# Patient Record
Sex: MALE | Race: WHITE | URBAN - METROPOLITAN AREA
[De-identification: names, ages, dates, MRNs, and addresses within clinical notes are randomized per-mention and may not be internally consistent; named-entity substitution may affect disease eponyms.]

---

## 2018-03-30 ENCOUNTER — TRANSFERRED RECORDS (OUTPATIENT)
Dept: HEALTH INFORMATION MANAGEMENT | Facility: CLINIC | Age: 58
End: 2018-03-30

## 2018-04-03 ENCOUNTER — TELEPHONE (OUTPATIENT)
Dept: OPHTHALMOLOGY | Facility: CLINIC | Age: 58
End: 2018-04-03

## 2018-04-03 NOTE — TELEPHONE ENCOUNTER
Cancellation tomorrow with dr. Justin    Scheduled in open new slot at 2 pm    Left message with date/time/location and direct triage number and general eye clinic number      Yong Minor RN 4:28 PM 04/03/18

## 2018-04-03 NOTE — TELEPHONE ENCOUNTER
Lisa Ly NP from [facility] called.   She was calling about referral and left VM.   [phone]   --  Triage attempted to call clinic yesterday afternoon after receiving message and was unsuccessful    Per message left today for me.    --   I spoke with clinic nurse this AM    Lisa Ly f/u referral to neuro-ophthalmology from [facility] eye clinic (no referral in system and pt partcially registered)    Pt's Cell number--  [phone]    Peripheral visual field loss last Friday 3-30-18   Ischemic optic neuropathy    CRP and ESR normal and EKG unchanged (h/o bradycardia and bundle block)    ---  Spoke to pt-- slowly started on right eye approximately 3-23-18  Seen 3-28-18 and again 3-30-18 for right eye vision loss  Vision worsened between those two days-- vision foggy  Since Friday no changes  --    Spoke to referring NP who had referral eye note from [facility]  Non proliferative diabetic retinopathy/macular edema    Ischemic optic neuropathy-- no disc edema noted  --    Will review with dr. Benavides  Notes to be faxed   Yong Minor RN 10:28 AM 04/03/18

## 2018-04-04 ENCOUNTER — HOSPITAL ENCOUNTER (OUTPATIENT)
Dept: MRI IMAGING | Facility: CLINIC | Age: 58
Discharge: HOME | End: 2018-04-04
Attending: OPHTHALMOLOGY | Admitting: OPHTHALMOLOGY
Payer: COMMERCIAL

## 2018-04-04 ENCOUNTER — OFFICE VISIT (OUTPATIENT)
Dept: OPHTHALMOLOGY | Facility: CLINIC | Age: 58
End: 2018-04-04
Attending: OPHTHALMOLOGY
Payer: COMMERCIAL

## 2018-04-04 ENCOUNTER — HOSPITAL ENCOUNTER (OUTPATIENT)
Dept: MRI IMAGING | Facility: CLINIC | Age: 58
End: 2018-04-04
Attending: OPHTHALMOLOGY
Payer: COMMERCIAL

## 2018-04-04 DIAGNOSIS — H46.9 OPTIC NEUROPATHY: ICD-10-CM

## 2018-04-04 DIAGNOSIS — H53.40 VISUAL FIELD DEFECT: ICD-10-CM

## 2018-04-04 DIAGNOSIS — H53.10 SUBJECTIVE VISUAL DISTURBANCE: Primary | ICD-10-CM

## 2018-04-04 PROCEDURE — 92133 CPTRZD OPH DX IMG PST SGM ON: CPT | Mod: ZF | Performed by: OPHTHALMOLOGY

## 2018-04-04 PROCEDURE — 70553 MRI BRAIN STEM W/O & W/DYE: CPT

## 2018-04-04 PROCEDURE — 25000128 H RX IP 250 OP 636: Performed by: OPHTHALMOLOGY

## 2018-04-04 PROCEDURE — A9585 GADOBUTROL INJECTION: HCPCS | Performed by: OPHTHALMOLOGY

## 2018-04-04 PROCEDURE — G0463 HOSPITAL OUTPT CLINIC VISIT: HCPCS | Mod: ZF | Performed by: TECHNICIAN/TECHNOLOGIST

## 2018-04-04 PROCEDURE — 92083 EXTENDED VISUAL FIELD XM: CPT | Mod: ZF | Performed by: OPHTHALMOLOGY

## 2018-04-04 PROCEDURE — 70543 MRI ORBT/FAC/NCK W/O &W/DYE: CPT

## 2018-04-04 RX ORDER — LOSARTAN POTASSIUM 50 MG/1
TABLET ORAL
COMMUNITY
Start: 2018-03-12

## 2018-04-04 RX ORDER — MECLIZINE HYDROCHLORIDE 25 MG/1
25 TABLET ORAL
COMMUNITY
Start: 2017-03-29

## 2018-04-04 RX ORDER — ROSUVASTATIN CALCIUM 10 MG/1
10 TABLET, COATED ORAL
COMMUNITY
Start: 2017-07-12

## 2018-04-04 RX ORDER — PANTOPRAZOLE SODIUM 40 MG/1
TABLET, DELAYED RELEASE ORAL
COMMUNITY
Start: 2017-07-09

## 2018-04-04 RX ORDER — VARDENAFIL HYDROCHLORIDE 20 MG/1
20 TABLET ORAL
COMMUNITY
Start: 2017-12-22

## 2018-04-04 RX ORDER — FLUTICASONE PROPIONATE 50 MCG
SPRAY, SUSPENSION (ML) NASAL
COMMUNITY
Start: 2017-12-18

## 2018-04-04 RX ORDER — GADOBUTROL 604.72 MG/ML
10 INJECTION INTRAVENOUS ONCE
Status: COMPLETED | OUTPATIENT
Start: 2018-04-04 | End: 2018-04-04

## 2018-04-04 RX ORDER — LORATADINE 10 MG/1
10 TABLET ORAL
COMMUNITY

## 2018-04-04 RX ORDER — FUROSEMIDE 20 MG
TABLET ORAL
COMMUNITY
Start: 2017-07-12

## 2018-04-04 RX ORDER — ATENOLOL 25 MG/1
TABLET ORAL
COMMUNITY
Start: 2017-04-06

## 2018-04-04 RX ADMIN — GADOBUTROL 10 ML: 604.72 INJECTION INTRAVENOUS at 20:37

## 2018-04-04 ASSESSMENT — REFRACTION_WEARINGRX
OS_CYLINDER: +0.50
OD_AXIS: 180
OS_ADD: +1.75
OS_AXIS: 125
OD_SPHERE: -1.00
OD_ADD: +1.75
OD_CYLINDER: +1.00
OS_SPHERE: -0.75

## 2018-04-04 ASSESSMENT — SLIT LAMP EXAM - LIDS
COMMENTS: NORMAL
COMMENTS: NORMAL

## 2018-04-04 ASSESSMENT — CONF VISUAL FIELD
OD_INFERIOR_TEMPORAL_RESTRICTION: 1
METHOD: COUNTING FINGERS
OD_SUPERIOR_TEMPORAL_RESTRICTION: 3
OD_INFERIOR_NASAL_RESTRICTION: 1

## 2018-04-04 ASSESSMENT — CUP TO DISC RATIO
OD_RATIO: 0.2
OS_RATIO: 0.2

## 2018-04-04 ASSESSMENT — VISUAL ACUITY
CORRECTION_TYPE: GLASSES
METHOD: SNELLEN - LINEAR
OD_CC+: -2
OD_CC: 20/50
OS_CC: 20/20

## 2018-04-04 ASSESSMENT — TONOMETRY
OS_IOP_MMHG: 14
IOP_METHOD: ICARE
OD_IOP_MMHG: 15

## 2018-04-04 ASSESSMENT — EXTERNAL EXAM - LEFT EYE: OS_EXAM: NORMAL

## 2018-04-04 ASSESSMENT — EXTERNAL EXAM - RIGHT EYE: OD_EXAM: NORMAL

## 2018-04-04 NOTE — MR AVS SNAPSHOT
After Visit Summary   4/4/2018    Salty Cavanaugh    MRN: [id number]           Patient Information     Date Of Birth          1960        Visit Information        Provider Department      4/4/2018 2:00 PM Jorge Zarate MD [facility]        Today's Diagnoses     Subjective visual disturbance    -  1    Optic neuropathy           Follow-ups after your visit        Your next 10 appointments already scheduled     Apr 04, 2018  7:00 PM CDT   (Arrive by 6:45 PM)   MR BRAIN W/O & W CONTRAST with RHMR1   [facility] ([facility])    [address]  [facility] [address]   [phone]           Take your medicines as usual, unless your doctor tells you not to. Bring a list of your current medicines to your exam (including vitamins, minerals and over-the-counter drugs).  You may or may not receive intravenous (IV) contrast for this exam pending the discretion of the Radiologist.  You do not need to do anything special to prepare.  The MRI machine uses a strong magnet. Please wear clothes without metal (snaps, zippers). A sweatsuit works well, or we may give you a hospital gown.  Please remove any body piercings and hair extensions before you arrive. You will also remove watches, jewelry, hairpins, wallets, dentures, partial dental plates and hearing aids. You may wear contact lenses, and you may be able to wear your rings. We have a safe place to keep your personal items, but it is safer to leave them at home.  **IMPORTANT** THE INSTRUCTIONS BELOW ARE ONLY FOR THOSE PATIENTS WHO HAVE BEEN PRESCRIBED SEDATION OR GENERAL ANESTHESIA DURING THEIR MRI PROCEDURE:  IF YOUR DOCTOR PRESCRIBED ORAL SEDATION (take medicine to help you relax during your exam):   You must get the medicine from your doctor (oral medication) before you arrive. Bring the medicine to the exam. Do not take it at home. You ll be told when to take it upon arriving for your exam.   Arrive one hour  early. Bring someone who can take you home after the test. Your medicine will make you sleepy. After the exam, you may not drive, take a bus or take a taxi by yourself.  IF YOUR DOCTOR PRESCRIBED IV SEDATION:   Arrive one hour early. Bring someone who can take you home after the test. Your medicine will make you sleepy. After the exam, you may not drive, take a bus or take a taxi by yourself.   No eating 6 hours before your exam. You may have clear liquids up until 4 hours before your exam. (Clear liquids include water, clear tea, black coffee and fruit juice without pulp.)  IF YOUR DOCTOR PRESCRIBED ANESTHESIA (be asleep for your exam):   Arrive 1 1/2 hours early. Bring someone who can take you home after the test. You may not drive, take a bus or take a taxi by yourself.   No eating 8 hours before your exam. You may have clear liquids up until 4 hours before your exam. (Clear liquids include water, clear tea, black coffee and fruit juice without pulp.)   You will spend four to five hours in the recovery room.  Please call the Imaging Department at your exam site with any questions.            Apr 04, 2018  7:45 PM CDT   (Arrive by 7:30 PM)   MR ORBIT W/O & W CONTRAST with RHMR1   [facility] MRI ([facility])    [address] [facility] [address] [phone]           Take your medicines as usual, unless your doctor tells you not to. Bring a list of your current medicines to your exam (including vitamins, minerals and over-the-counter drugs).  You may or may not receive intravenous (IV) contrast for this exam pending the discretion of the Radiologist.  You do not need to do anything special to prepare.  The MRI machine uses a strong magnet. Please wear clothes without metal (snaps, zippers). A sweatsuit works well, or we may give you a hospital gown.  Please remove any body piercings and hair extensions before you arrive. You will also remove watches, jewelry, hairpins, wallets,  dentures, partial dental plates and hearing aids. You may wear contact lenses, and you may be able to wear your rings. We have a safe place to keep your personal items, but it is safer to leave them at home.  **IMPORTANT** THE INSTRUCTIONS BELOW ARE ONLY FOR THOSE PATIENTS WHO HAVE BEEN PRESCRIBED SEDATION OR GENERAL ANESTHESIA DURING THEIR MRI PROCEDURE:  IF YOUR DOCTOR PRESCRIBED ORAL SEDATION (take medicine to help you relax during your exam):   You must get the medicine from your doctor (oral medication) before you arrive. Bring the medicine to the exam. Do not take it at home. You ll be told when to take it upon arriving for your exam.   Arrive one hour early. Bring someone who can take you home after the test. Your medicine will make you sleepy. After the exam, you may not drive, take a bus or take a taxi by yourself.  IF YOUR DOCTOR PRESCRIBED IV SEDATION:   Arrive one hour early. Bring someone who can take you home after the test. Your medicine will make you sleepy. After the exam, you may not drive, take a bus or take a taxi by yourself.   No eating 6 hours before your exam. You may have clear liquids up until 4 hours before your exam. (Clear liquids include water, clear tea, black coffee and fruit juice without pulp.)  IF YOUR DOCTOR PRESCRIBED ANESTHESIA (be asleep for your exam):   Arrive 1 1/2 hours early. Bring someone who can take you home after the test. You may not drive, take a bus or take a taxi by yourself.   No eating 8 hours before your exam. You may have clear liquids up until 4 hours before your exam. (Clear liquids include water, clear tea, black coffee and fruit juice without pulp.)   You will spend four to five hours in the recovery room.  Please call the Imaging Department at your exam site with any questions.              Future tests that were ordered for you today     Open Future Orders        Priority Expected Expires Ordered    MRI Brain w & w/o contrast STAT  4/4/2019 4/4/2018    MR  Orbit w/o & w Contrast STAT  2019            Who to contact     Please call your clinic at [phone] to:    Ask questions about your health    Make or cancel appointments    Discuss your medicines    Learn about your test results    Speak to your doctor            Additional Information About Your Visit        MyChart Information     [employer] is an electronic gateway that provides easy, online access to your medical records. With Backand, you can request a clinic appointment, read your test results, renew a prescription or communicate with your care team.     To sign up for Back[employer] visit the website at [email or web address]   You will be asked to enter the access code listed below, as well as some personal information. Please follow the directions to create your username and password.     Your access code is: [email or web address]  Expires: 7/3/2018  6:31 AM     Your access code will  in 90 days. If you need help or a new code, please contact your [facility] or call [phone] for assistance.        Care EveryWhere ID     This is your Care EveryWhere ID. This could be used by other organizations to access your [place] medical records  [id number]         Blood Pressure from Last 3 Encounters:   No data found for BP    Weight from Last 3 Encounters:   No data found for Wt              We Performed the Following     Glaucoma Top OU     OCT Optic Nerve RNFL Spectralis OU (both eyes)        Primary Care Provider Office Phone # Fax #    Duane Strand [phone]       [address] [address]        Equal Access to Services     MILENA DURHAM : Hadii aad ku hadasho Soomaali, waaxda luqadaha, qaybta kaalmada adeegyada, waxay iramin haychann bhavana brody'dayanna piña. So [facility] [phone].    ATENCIÓN: Si habla español, tiene a espinoza disposición servicios gratuitos de asistencia lingüística. Llame al [phone].    We comply with  applicable federal civil rights laws and [place] laws. We do not discriminate on the basis of race, color, national origin, age, disability, sex, sexual orientation, or gender identity.            Thank you!     Thank you for choosing EYE CLINIC  for your care. Our goal is always to provide you with excellent care. Hearing back from our patients is one way we can continue to improve our services. Please take a few minutes to complete the written survey that you may receive in the mail after your visit with us. Thank you!             Your Updated Medication List - Protect others around you: Learn how to safely use, store and throw away your medicines at www.disposemymeds.org.          This list is accurate as of 4/4/18  4:01 PM.  Always use your most recent med list.                   Brand Name Dispense Instructions for use Diagnosis    aspirin 81 MG EC tablet      Take 81 mg by mouth        atenolol 25 MG tablet    TENORMIN     TAKE ONE TABLET BY MOUTH EVERY DAY        fluticasone 50 MCG/ACT spray    FLONASE     INSTILL 2 SPRAYS NASALLY ONE TIME A DAY. SHAKE WELL BEFORE USE; ALTERN ATE NOSTRILS WITH EACH SPRAY.        furosemide 20 MG tablet    LASIX     Take 1 tab every Monday weds and friday        loratadine 10 MG tablet    CLARITIN     Take 10 mg by mouth        losartan 50 MG tablet    COZAAR     TAKE ONE TABLET BY MOUTH EVERY DAY        meclizine 25 MG tablet    ANTIVERT     Take 25 mg by mouth        metFORMIN 500 MG tablet    GLUCOPHAGE     TAKE ONE TABLET BY MOUTH EVERY DAY WITH FOOD        Misc. Devices Misc      Supply requested: vacuum erection device.  Diagnosis: erectile dysfunction        pantoprazole 40 MG EC tablet    PROTONIX     TAKE ONE TABLET BY MOUTH EVERY DAY        rosuvastatin 10 MG tablet    CRESTOR     Take 10 mg by mouth        vardenafil 20 MG tablet    LEVITRA     Take 20 mg by mouth        vitamin D3 1000 UNITS Caps

## 2018-04-04 NOTE — PROGRESS NOTES
1. Progressive retrobulbar right optic neuropathy- will institute urgent MRI brain and orbits with and without IV contrast.  If negative will proceed with additional work-up for giant cell arteritis (temporal artery biopsy).    Salty Cavanaugh is a pleasant 57 year old White male who presents to my neuro-ophthalmology clinic today as a referral from Dr. Ho for a right acute optic neuropathy of unclear etiology.    Patient noted painless vision loss in the inferior half of his vision in the right eye.  He first noted this on 3/23/18.  He has had kaleidoscope like visual phenomena in the right eye also.   His visual field defect started inferiorly and has progressed over days to involve his entire field.    No jaw claudication.  He did have an infected tooth / abscess pulled on Monday and replaced with a partial.  No proximal muscle weakness or fatigue. No low grade fevers.  No scalp tenderness or temple tenderness.  He has had mild right sided temple pain recently (very mild).      The patient was seen on March 30, 2018 by Dr. Ho and at that time was found to have 2040 visual acuity in the right eye with correction.  He described a central and inferior visual field defect at that time.  Dilated fundus examination showed 1 dot blot hemorrhage on the right and a normal-appearing optic nerve head without swelling or pallor.  Fluorescein angiography was performed and was essentially unremarkable aside from blockage from the dot blot heme.  There was no comment on choroidal filling abnormalities thus presumably vascular filling of the choroid and retina was unremarkable.  OCT of the macula was unremarkable aside from a few parafoveal cysts in the right eye that were incidental and  would not explain his visual loss.  The patient underwent appropriate blood testing for giant cell arteritis screening purposes.  He was referred to my clinic for further evaluation.    Blood work on 3/30/18 shows: normal complete  blood count (CBC) with platelets of 199, CRP 0.1, ESR 14.    Past medical history: diabetes mellitus type 2- last hgbA1c was 6.8% (in last 6 months).   Patient had gastric bypass surgery in 2006.  Has lost 165 lbs (regained 50 lbs in last 2-3 years).  No ETOH.  No smoking.  History of obstructive sleep apnea on CPAP. Hypertension- controlled with medication.  Hyperlipidemia controlled.  Patient uses Levitra and last used 3-4 weeks ago.  Patient takes baby aspirin daily.    Examination today shows 20/50 -2 vision in the right eye and 20/20 in the left.  The patient was unable to identify any Ishihara color plates in the right eye and color vision was normal in the left.  There was a right afferent pupillary defect on my check.  Slit-lamp examination was remarkable for moderate nuclear sclerotic cataracts in both eyes that were symmetric and not visually significant.  Dilated fundus exam showed rare scattered lattice degeneration in the periphery in both eyes as well as frequent cobblestone degeneration.  Both optic nerves were normal appearing without swelling or pallor.  Cranial nerves V1-3, 7,8,9,11, and 12 were normal bilaterally.    OCT of the optic nerve head revealed normal retinal nerve fiber layer thickness in both eyes.  Octopus automated 30 degree visual field today showed a dense inferior altitudinal defect with encroachment into the superior visual field and central visual axis in the right eye.  In the left eye there was scattered peripheral constriction of unclear significance.    While this patient certainly has risk factors for poor blood vessel health and ischemic optic neuropathy and he has a small cup-to-disc ratio in both eyes, his lack of optic nerve head swelling excludes the possibility of non-arteritic anterior ischemic optic neuropathy.  He certainly has a right optic neuropathy but in the setting of a normal optic nerve head the localization is retrobulbar.  Given this context, etiologies  could include a rapidly compressive optic neuropathy such as an aneurysm or tumor with recent hemorrhage.  He could also have an atypical optic neuritis.  If the MRI comes back normal we will have to revisit the possibility of posterior ischemic optic neuropathy secondary to giant cell arteritis.   His normal acute phase reactants and lack of other symptoms to suggest giant cell arteritis makes it less likely however it still remains a distinct possibility if neuro-imaging unremarkable.    Addendum (4/5/18):   MRI of the brain and orbits with and without IV contrast performed urgently on April 4, 2018 came back unremarkable per radiology. I reviewed the images myself and I agree with the interpretation.    In this setting we must reconsider the diagnosis of giant cell arteritis even though patient does not have suggestive symptoms nor elevated acute phase reactants nor choroidal filling abnormalities.  I have seen patients with biopsy confirmed giant cell arteritis present in exactly this fashion previously.  I called the patient and informed him of his MRI findings and discussed the plan moving forward.  We will place him on prednisone 60 mg daily pending the results of a right sided temporal artery biopsy early next week.  I have asked him to take his usual Protonix twice daily instead of once daily for GI protection.  If his temporal artery biopsy is negative we will taper him off steroids quickly.  Finally I informed him that his blood sugars may increase with prednisone and that he must monitor them carefully given his pre-existing diabetes.  He should call his primary care physician if his blood sugars go high.         I spent a total of 60 minutes face to face with Salty Cavanaugh during today's office visit.  Over 50% of this time was spent counseling the patient and/or coordinating care regarding right optic neuropathy of unclear cause.    Complete documentation of historical and exam elements from today's  encounter can be found in the full encounter summary report (not reduplicated in this progress note).  I personally obtained the chief complaint(s) and history of present illness.  I confirmed and edited as necessary the review of systems, past medical/surgical history, family history, social history, and examination findings as documented by others; and I examined the patient myself.  I personally reviewed the relevant tests, images, and reports as documented above.  I formulated and edited as necessary the assessment and plan and discussed the findings and management plan with the patient and family.  I personally reviewed the ophthalmic test(s) associated with this encounter, agree with the interpretation(s) as documented by the resident/fellow, and have edited the corresponding report(s) as necessary.     Jorge Zarate MD

## 2018-04-04 NOTE — LETTER
2018    RE: Salty Cavanaugh  : 1960  MRN: [id number]    Dear Dr. Ho,    Thank you for referring your patient, Salty Cavanaugh, to my neuro-ophthalmology clinic recently.  After a thorough neuro-ophthalmic history and examination, I came to the following conclusions:     1. Progressive retrobulbar right optic neuropathy- will institute urgent MRI brain and orbits with and without IV contrast.  If negative will proceed with additional work-up for giant cell arteritis (temporal artery biopsy).    Salty Cavanaugh is a pleasant 57 year old White male who presents to my neuro-ophthalmology clinic today as a referral from Dr. Ho for a right acute optic neuropathy of unclear etiology.    Patient noted painless vision loss in the inferior half of his vision in the right eye.  He first noted this on 3/23/18.  He has had kaleidoscope like visual phenomena in the right eye also.   His visual field defect started inferiorly and has progressed over days to involve his entire field.    No jaw claudication.  He did have an infected tooth / abscess pulled on Monday and replaced with a partial.  No proximal muscle weakness or fatigue. No low grade fevers.  No scalp tenderness or temple tenderness.  He has had mild right sided temple pain recently (very mild).      The patient was seen on 2018 by Dr. Ho and at that time was found to have 2040 visual acuity in the right eye with correction.  He described a central and inferior visual field defect at that time.  Dilated fundus examination showed 1 dot blot hemorrhage on the right and a normal-appearing optic nerve head without swelling or pallor.  Fluorescein angiography was performed and was essentially unremarkable aside from blockage from the dot blot heme.  There was no comment on choroidal filling abnormalities thus presumably vascular filling of the choroid and retina was unremarkable.  OCT of the macula was unremarkable aside from a few parafoveal  cysts in the right eye that were incidental and  would not explain his visual loss.  The patient underwent appropriate blood testing for giant cell arteritis screening purposes.  He was referred to my clinic for further evaluation.    Blood work on 3/30/18 shows: normal complete blood count (CBC) with platelets of 199, CRP 0.1, ESR 14.    Past medical history: diabetes mellitus type 2- last hgbA1c was 6.8% (in last 6 months).   Patient had gastric bypass surgery in 2006.  Has lost 165 lbs (regained 50 lbs in last 2-3 years).  No ETOH.  No smoking.  History of obstructive sleep apnea on CPAP. Hypertension- controlled with medication.  Hyperlipidemia controlled.  Patient uses Levitra and last used 3-4 weeks ago.  Patient takes baby aspirin daily.    Examination today shows 20/50 -2 vision in the right eye and 20/20 in the left.  The patient was unable to identify any Ishihara color plates in the right eye and color vision was normal in the left.  There was a right afferent pupillary defect on my check.  Slit-lamp examination was remarkable for moderate nuclear sclerotic cataracts in both eyes that were symmetric and not visually significant.  Dilated fundus exam showed rare scattered lattice degeneration in the periphery in both eyes as well as frequent cobblestone degeneration.  Both optic nerves were normal appearing without swelling or pallor.  Cranial nerves V1-3, 7,8,9,11, and 12 were normal bilaterally.    OCT of the optic nerve head revealed normal retinal nerve fiber layer thickness in both eyes.  Octopus automated 30 degree visual field today showed a dense inferior altitudinal defect with encroachment into the superior visual field and central visual axis in the right eye.  In the left eye there was scattered peripheral constriction of unclear significance.    While this patient certainly has risk factors for poor blood vessel health and ischemic optic neuropathy and he has a small cup-to-disc ratio in both  eyes, his lack of optic nerve head swelling excludes the possibility of non-arteritic anterior ischemic optic neuropathy.  He certainly has a right optic neuropathy but in the setting of a normal optic nerve head the localization is retrobulbar.  Given this context, etiologies could include a rapidly compressive optic neuropathy such as an aneurysm or tumor with recent hemorrhage.  He could also have an atypical optic neuritis.  If the MRI comes back normal we will have to revisit the possibility of posterior ischemic optic neuropathy secondary to giant cell arteritis.   His normal acute phase reactants and lack of other symptoms to suggest giant cell arteritis makes it less likely however it still remains a distinct possibility if neuro-imaging unremarkable.    Addendum (4/5/18):   MRI of the brain and orbits with and without IV contrast performed urgently on April 4, 2018 came back unremarkable per radiology. I reviewed the images myself and I agree with the interpretation.    In this setting we must reconsider the diagnosis of giant cell arteritis even though patient does not have suggestive symptoms nor elevated acute phase reactants nor choroidal filling abnormalities.  I have seen patients with biopsy confirmed giant cell arteritis present in exactly this fashion previously.  I called the patient and informed him of his MRI findings and discussed the plan moving forward.  We will place him on prednisone 60 mg daily pending the results of a right sided temporal artery biopsy early next week.  I have asked him to take his usual Protonix twice daily instead of once daily for GI protection.  If his temporal artery biopsy is negative we will taper him off steroids quickly.  Finally I informed him that his blood sugars may increase with prednisone and that he must monitor them carefully given his pre-existing diabetes.  He should call his primary care physician if his blood sugars go high.    Again, thank you for  trusting me with the care of your patient.  For further exam details, please feel free to contact our office for additional records.  If you wish to contact me regarding this patient please email me at [email or web address] or give my clinic a call to arrange a phone conversation.    Sincerely,  Jorge Zarate MD  , Neuro-Ophthalmology and Adult Strabismus  Department of Ophthalmology and Visual Neurosciences  [facility]    DX: retrobulbar optic neuropathy

## 2018-04-04 NOTE — NURSING NOTE
Chief Complaints and History of Present Illnesses   Patient presents with     New Patient     Ischemic optic neuropathy     HPI    Symptoms:              Comments:  Salty Cavanaugh is a 57 year old M, here for ischemic optic neuropathy.     Per telephone note with Yong on 4/3/2018:  -Peripheral visual field loss last Friday 3-30-18   Spoke to patient-- slowly started on right eye approximately 3-23-18  Seen 3-28-18 and again 3-30-18 for right eye vision loss  Vision worsened between those two days-- vision foggy    -CRP and ESR normal and EKG unchanged (h/o bradycardia and bundle block)    -Non proliferative diabetic retinopathy/macular edema  -Ischemic optic neuropathy-- no disc edema noted    Patient states vision loss has gotten worse initially but not worse since last Friday when he saw ophth.   Pain on RIGHT temporal vision.   Denies double vision.   ISIDRA Arreguin 4/4/2018 1:52 PM

## 2018-04-05 ENCOUNTER — TELEPHONE (OUTPATIENT)
Dept: OPHTHALMOLOGY | Facility: CLINIC | Age: 58
End: 2018-04-05

## 2018-04-05 RX ORDER — PREDNISONE 20 MG/1
60 TABLET ORAL EVERY MORNING
Qty: 90 TABLET | Refills: 6 | Status: SHIPPED | OUTPATIENT
Start: 2018-04-05

## 2018-04-05 NOTE — TELEPHONE ENCOUNTER
Contacted pt at the request of Dr Zarate to schedule right-sided TAB.  Added them to Dr. Benavides's schedule 4/11.

## 2018-04-10 ENCOUNTER — TELEPHONE (OUTPATIENT)
Dept: OPHTHALMOLOGY | Facility: CLINIC | Age: 58
End: 2018-04-10

## 2018-04-10 NOTE — TELEPHONE ENCOUNTER
----- Message from Lynn Torre sent at 4/9/2018 10:50 AM CDT -----  Regarding: FW: Pt wondering if Dr. Zarate will be at next appt  Contact: [phone]  Can you please call this patient to let them know either way.    Thanks,    Lynn Torre COT 10:51 AM April 9, 2018     ----- Message -----     From: Steve Armijo     Sent: 4/9/2018   9:54 AM       To: [facility]  Subject: Pt wondering if Dr. Zarate will be at ne#    Pt called wondering if Dr. Zarate will be at his next procedure appt with Dr. Benavides. Either way, please call pt back at [phone].    Thank you,    Steve Armijo  Call Center    Please DO NOT send this message and/or reply back to sender. Call Center Representatives DO NOT respond to messages.

## 2018-04-10 NOTE — TELEPHONE ENCOUNTER
Called and spoke with patient, let him know Dr. Zarate wouldn't be at his TAB appointment, patient said he was wondering because last time we got him in for MRI so quickly so they stayed in city and got everything done before heading home. Patient states if an appointment is necessary after TAB he is willing to stay in city and come for appointment a day or two later. Otherwise, they will head home and wait for call about results. I told patient I would ask doctor to review results as soon as they are available and communicate plan going forward to patient asap.

## 2018-04-11 ENCOUNTER — OFFICE VISIT (OUTPATIENT)
Dept: OPHTHALMOLOGY | Facility: CLINIC | Age: 58
End: 2018-04-11
Payer: COMMERCIAL

## 2018-04-11 DIAGNOSIS — H53.131 SUDDEN VISUAL LOSS OF RIGHT EYE: Primary | ICD-10-CM

## 2018-04-11 NOTE — PROGRESS NOTES
Assessment & Plan     Salty Cavanaugh is a 57 year old male with the following diagnoses:   1. Sudden visual loss of right eye       Patient underwent right temporal artery biopsy without complication.  Patient instructed to remove patch tomorrow.  No swimming for a week.  Stitches will dissolve. Ok to wash starting tomorrow.  Will use bacitracin ointment twice a day until tube is gone.  Patient instructed to put ice on the patch today and take tylenol for pain.  Patient instructed to put direct pressure on the wound if it bleeds.  If still bleeding > 20 min, patient instructed to call the office at [phone].             Attending Physician Attestation:  Complete documentation of historical and exam elements from today's encounter can be found in the full encounter summary report (not reduplicated in this progress note).  I personally obtained the chief complaint(s) and history of present illness.  I confirmed and edited as necessary the review of systems, past medical/surgical history, family history, social history, and examination findings as documented by others; and I examined the patient myself.  I personally reviewed the relevant tests, images, and reports as documented above.  I formulated and edited as necessary the assessment and plan and discussed the findings and management plan with the patient and family. - Erwin Benavides MD

## 2018-04-11 NOTE — MR AVS SNAPSHOT
After Visit Summary   2018    Salty Cavanaugh    MRN: [id number]           Patient Information     Date Of Birth          1960        Visit Information        Provider Department      2018 11:00 AM Erwin Benavides MD [facility]        Today's Diagnoses     Sudden visual loss of right eye    -  1       Follow-ups after your visit        Future tests that were ordered for you today     Open Future Orders        Priority Expected Expires Ordered    Surgical pathology exam Routine  7/10/2018 2018            Who to contact     Please call your clinic at [phone] to:    Ask questions about your health    Make or cancel appointments    Discuss your medicines    Learn about your test results    Speak to your doctor            Additional Information About Your Visit        MyChart Information     [employer] is an electronic gateway that provides easy, online access to your medical records. With [employer]Rock[employer], you can request a clinic appointment, read your test results, renew a prescription or communicate with your care team.     To sign up for [employer] visit the website at [email or web address]   You will be asked to enter the access code listed below, as well as some personal information. Please follow the directions to create your username and password.     Your access code is: [email or web address]  Expires: 7/3/2018  6:31 AM     Your access code will  in 90 days. If you need help or a new code, please contact your [facility] or call [phone] for assistance.        Care EveryWhere ID     This is your Care EveryWhere ID. This could be used by other organizations to access your [facility] medical records  [id number]         Blood Pressure from Last 3 Encounters:   No data found for BP    Weight from Last 3 Encounters:   No data found for Wt              We Performed the Following     Ligation or Biopsy of Temporal Artery        Primary Care  Provider Office Phone # Fax #    Duane Strand [phone]       [address] [address]        Equal Access to Services     DALJITROSANNE HERMINIO : Hadmonserrat tanmay pereira artemioo Sobobali, waaxda luqadaha, qaybta kaalmada adejesse, dax kinney laLorridayanna piña. So [facility] [phone].    ATENCIÓN: Si habla español, tiene a espinoza disposición servicios gratuitos de asistencia lingüística. Sophia al [phone].    We comply with applicable federal civil rights laws and [place] laws. We do not discriminate on the basis of race, color, national origin, age, disability, sex, sexual orientation, or gender identity.            Thank you!     Thank you for choosing [facility] OPHTHALMOLOGY  for your care. Our goal is always to provide you with excellent care. Hearing back from our patients is one way we can continue to improve our services. Please take a few minutes to complete the written survey that you may receive in the mail after your visit with us. Thank you!             Your Updated Medication List - Protect others around you: Learn how to safely use, store and throw away your medicines at www.disposemymeds.org.          This list is accurate as of 4/11/18 12:12 PM.  Always use your most recent med list.                   Brand Name Dispense Instructions for use Diagnosis    aspirin 81 MG EC tablet      Take 81 mg by mouth        atenolol 25 MG tablet    TENORMIN     TAKE ONE TABLET BY MOUTH EVERY DAY        fluticasone 50 MCG/ACT spray    FLONASE     INSTILL 2 SPRAYS NASALLY ONE TIME A DAY. SHAKE WELL BEFORE USE; ALTERN ATE NOSTRILS WITH EACH SPRAY.        furosemide 20 MG tablet    LASIX     Take 1 tab every Monday weds and friday        loratadine 10 MG tablet    CLARITIN     Take 10 mg by mouth        losartan 50 MG tablet    COZAAR     TAKE ONE TABLET BY MOUTH EVERY DAY        meclizine 25 MG tablet    ANTIVERT     Take 25 mg by mouth        metFORMIN 500 MG tablet     GLUCOPHAGE     TAKE ONE TABLET BY MOUTH EVERY DAY WITH FOOD        Misc. Devices Misc      Supply requested: vacuum erection device.  Diagnosis: erectile dysfunction        pantoprazole 40 MG EC tablet    PROTONIX     TAKE ONE TABLET BY MOUTH EVERY DAY        predniSONE 20 MG tablet    DELTASONE    90 tablet    Take 3 tablets (60 mg) by mouth every morning    Optic neuropathy       rosuvastatin 10 MG tablet    CRESTOR     Take 10 mg by mouth        vardenafil 20 MG tablet    LEVITRA     Take 20 mg by mouth        vitamin D3 1000 units Caps

## 2018-04-12 LAB — COPATH REPORT: NORMAL

## 2018-04-13 ENCOUNTER — TELEPHONE (OUTPATIENT)
Dept: OPHTHALMOLOGY | Facility: CLINIC | Age: 58
End: 2018-04-13

## 2018-04-13 NOTE — PROGRESS NOTES
Enclosed are the results of your temporal artery biopsy. Dr. Jorge Zarate will discuss next steps with you.     Thank you for allowing me to share in your care.     Erwin Benavides MD

## 2018-04-13 NOTE — TELEPHONE ENCOUNTER
I called and spoke with patient on the phone regarding the results of his recent temporal artery biopsy which came back normal / negative for giant cell arteritis.  At this point we do not have sufficient evidence for giant cell arteritis to continue corticosteroid treatment.  The patient feels his vision may be slightly better since our recent clinci visit but no dramatic changes.  Advised patient to taper off corticosteroids as follows:    Prednisone 40 mg daily x 2 days  Prednisone 20 mg daily x 2 days  Prednisone 10 mg daily x 2 days then stop    Go back to once daily (routine) GI prophylaxis once off Prednisone.  Patient will follow-up with me in 3 weeks for repeat check-- or sooner as needed for worsening vision.

## 2018-05-01 DIAGNOSIS — H53.40 VISUAL FIELD DEFECT: Primary | ICD-10-CM

## 2018-05-02 ENCOUNTER — OFFICE VISIT (OUTPATIENT)
Dept: OPHTHALMOLOGY | Facility: CLINIC | Age: 58
End: 2018-05-02
Attending: OPHTHALMOLOGY
Payer: COMMERCIAL

## 2018-05-02 DIAGNOSIS — H53.10 SUBJECTIVE VISUAL DISTURBANCE: Primary | ICD-10-CM

## 2018-05-02 DIAGNOSIS — H46.9 OPTIC NEUROPATHY: ICD-10-CM

## 2018-05-02 DIAGNOSIS — H53.40 VISUAL FIELD DEFECT: ICD-10-CM

## 2018-05-02 LAB
CRP SERPL-MCNC: <2.9 MG/L (ref 0–8)
ERYTHROCYTE [SEDIMENTATION RATE] IN BLOOD BY WESTERGREN METHOD: 11 MM/H (ref 0–20)

## 2018-05-02 PROCEDURE — 92083 EXTENDED VISUAL FIELD XM: CPT | Mod: ZF | Performed by: OPHTHALMOLOGY

## 2018-05-02 PROCEDURE — G0463 HOSPITAL OUTPT CLINIC VISIT: HCPCS | Mod: ZF | Performed by: TECHNICIAN/TECHNOLOGIST

## 2018-05-02 PROCEDURE — 92133 CPTRZD OPH DX IMG PST SGM ON: CPT | Mod: ZF | Performed by: OPHTHALMOLOGY

## 2018-05-02 ASSESSMENT — VISUAL ACUITY
CORRECTION_TYPE: GLASSES
OS_CC: 20/20
METHOD: SNELLEN - LINEAR
OD_CC: 20/25

## 2018-05-02 ASSESSMENT — REFRACTION_WEARINGRX
OD_AXIS: 180
OS_ADD: +1.75
OD_CYLINDER: +1.00
OD_SPHERE: -1.00
OS_CYLINDER: +0.50
OS_AXIS: 125
OS_SPHERE: -0.75
OD_ADD: +1.75

## 2018-05-02 ASSESSMENT — SLIT LAMP EXAM - LIDS
COMMENTS: NORMAL
COMMENTS: NORMAL

## 2018-05-02 ASSESSMENT — TONOMETRY
OD_IOP_MMHG: 11
OS_IOP_MMHG: 10
IOP_METHOD: ICARE

## 2018-05-02 ASSESSMENT — CUP TO DISC RATIO
OS_RATIO: 0.2
OD_RATIO: 0.2

## 2018-05-02 ASSESSMENT — EXTERNAL EXAM - RIGHT EYE: OD_EXAM: NORMAL

## 2018-05-02 ASSESSMENT — EXTERNAL EXAM - LEFT EYE: OS_EXAM: NORMAL

## 2018-05-02 NOTE — NURSING NOTE
Chief Complaints and History of Present Illnesses   Patient presents with     Follow Up For     visual field defect     HPI    Symptoms:              Comments:  3 weeks follow up for visual field defect.     Temporal artery biopsy results came back unremarkable.   Last took medication, prednisone was last week (possibly Thursday per patient).     Vision in LEFT eye blurry, saw local OD and was told BS may be high and affecting LEFT eye vision, observe.     ISIDRA Arreguin 5/2/2018 10:47 AM

## 2018-05-02 NOTE — MR AVS SNAPSHOT
After Visit Summary   5/2/2018    Salty Cavanaugh    MRN: [id number]           Patient Information     Date Of Birth          1960        Visit Information        Provider Department      5/2/2018 10:45 AM Jorge Zarate MD [facility]        Today's Diagnoses     Subjective visual disturbance - Left Eye    -  1    Visual field defect - Both Eyes        Optic neuropathy - Left Eye           Follow-ups after your visit        Your next 10 appointments already scheduled     May 30, 2018 10:30 AM CDT   RETURN NEURO with Jorge Zarate MD   Eye Clinic ([facility])    [address]  [facility] [address]   [phone]              Who to contact     Please call your clinic at [phone] to:    Ask questions about your health    Make or cancel appointments    Discuss your medicines    Learn about your test results    Speak to your doctor            Additional Information About Your Visit        MyChart Information     [employer] gives you secure access to your electronic health record. If you see a primary care provider, you can also send messages to your care team and make appointments. If you have questions, please call your primary care clinic.  If you do not have a primary care provider, please call [phone] and they will assist you.      [employer] is an electronic gateway that provides easy, online access to your medical records. With [employer], you can request a clinic appointment, read your test results, renew a prescription or communicate with your care team.     To access your existing account, please contact your [facility] or call [phone] for assistance.        Care EveryWhere ID     This is your Care EveryWhere ID. This could be used by other organizations to access your [facility] medical records  [id number]         Blood Pressure from Last 3 Encounters:   No data found for  BP    Weight from Last 3 Encounters:   No data found for Wt              We Performed the Following     Glaucoma Top OU     IOP Measurement     OCT Optic Nerve RNFL Spectralis OU (both eyes)        Primary Care Provider Office Phone # Fax #    Duane Strand [phone]       [place] [address] [facility] [address]        Equal Access to Services     MILENA DURHAM : Hadii aad ku hadasho Soomaali, waaxda luqadaha, qaybta kaalmada adeegyada, waxay idiin hayaan adeeg irenemartha lanavin . So [facility] [place] [facility] [phone].    ATENCIÓN: Si habla español, tiene a espinoza disposición servicios gratuitos de asistencia lingüística. Fernandopopeye al [phone].    We comply with applicable federal civil rights laws and [place] laws. We do not discriminate on the basis of race, color, national origin, age, disability, sex, sexual orientation, or gender identity.            Thank you!     Thank you for choosing EYE CLINIC  for your care. Our goal is always to provide you with excellent care. Hearing back from our patients is one way we can continue to improve our services. Please take a few minutes to complete the written survey that you may receive in the mail after your visit with us. Thank you!             Your Updated Medication List - Protect others around you: Learn how to safely use, store and throw away your medicines at www.disposemymeds.org.          This list is accurate as of 5/2/18 11:59 PM.  Always use your most recent med list.                   Brand Name Dispense Instructions for use Diagnosis    aspirin 81 MG EC tablet      Take 81 mg by mouth        atenolol 25 MG tablet    TENORMIN     TAKE ONE TABLET BY MOUTH EVERY DAY        fluticasone 50 MCG/ACT spray    FLONASE     INSTILL 2 SPRAYS NASALLY ONE TIME A DAY. SHAKE WELL BEFORE USE; ALTERN ATE NOSTRILS WITH EACH SPRAY.        furosemide 20 MG tablet    LASIX     Take 1 tab every Monday weds and friday        loratadine 10 MG tablet    CLARITIN     Take  10 mg by mouth        losartan 50 MG tablet    COZAAR     TAKE ONE TABLET BY MOUTH EVERY DAY        meclizine 25 MG tablet    ANTIVERT     Take 25 mg by mouth        metFORMIN 500 MG tablet    GLUCOPHAGE     TAKE ONE TABLET BY MOUTH EVERY DAY WITH FOOD        Misc. Devices Misc      Supply requested: vacuum erection device.  Diagnosis: erectile dysfunction        pantoprazole 40 MG EC tablet    PROTONIX     TAKE ONE TABLET BY MOUTH EVERY DAY        predniSONE 20 MG tablet    DELTASONE    90 tablet    Take 3 tablets (60 mg) by mouth every morning    Optic neuropathy       rosuvastatin 10 MG tablet    CRESTOR     Take 10 mg by mouth        vardenafil 20 MG tablet    LEVITRA     Take 20 mg by mouth        vitamin D3 1000 units Caps

## 2018-05-29 DIAGNOSIS — H53.10 SUBJECTIVE VISUAL DISTURBANCE: Primary | ICD-10-CM

## 2018-05-30 ENCOUNTER — OFFICE VISIT (OUTPATIENT)
Dept: OPHTHALMOLOGY | Facility: CLINIC | Age: 58
End: 2018-05-30
Attending: OPHTHALMOLOGY
Payer: COMMERCIAL

## 2018-05-30 DIAGNOSIS — H53.10 SUBJECTIVE VISUAL DISTURBANCE: ICD-10-CM

## 2018-05-30 DIAGNOSIS — H53.40 VISUAL FIELD DEFECT: ICD-10-CM

## 2018-05-30 DIAGNOSIS — H46.9 OPTIC NEUROPATHY: Primary | ICD-10-CM

## 2018-05-30 PROCEDURE — 92083 EXTENDED VISUAL FIELD XM: CPT | Mod: ZF | Performed by: OPHTHALMOLOGY

## 2018-05-30 PROCEDURE — G0463 HOSPITAL OUTPT CLINIC VISIT: HCPCS | Mod: ZF | Performed by: TECHNICIAN/TECHNOLOGIST

## 2018-05-30 RX ORDER — AMLODIPINE BESYLATE 2.5 MG/1
TABLET ORAL
COMMUNITY
Start: 2018-03-12

## 2018-05-30 RX ORDER — SIMVASTATIN 10 MG
TABLET ORAL
COMMUNITY
Start: 2017-06-28

## 2018-05-30 ASSESSMENT — VISUAL ACUITY
OS_CC: 20/20
CORRECTION_TYPE: GLASSES
METHOD: SNELLEN - LINEAR

## 2018-05-30 ASSESSMENT — REFRACTION_WEARINGRX
OS_SPHERE: -0.75
OD_CYLINDER: +1.00
OD_ADD: +1.75
OS_ADD: +1.75
OS_CYLINDER: +0.50
OD_SPHERE: -1.00
OD_AXIS: 180
OS_AXIS: 125

## 2018-05-30 ASSESSMENT — CONF VISUAL FIELD
OD_NORMAL: 1
METHOD: COUNTING FINGERS
OS_NORMAL: 1

## 2018-05-30 ASSESSMENT — CUP TO DISC RATIO
OS_RATIO: 0.2
OD_RATIO: 0.2

## 2018-05-30 ASSESSMENT — EXTERNAL EXAM - RIGHT EYE: OD_EXAM: NORMAL

## 2018-05-30 ASSESSMENT — EXTERNAL EXAM - LEFT EYE: OS_EXAM: NORMAL

## 2018-05-30 ASSESSMENT — TONOMETRY
OD_IOP_MMHG: 12
OS_IOP_MMHG: 11
IOP_METHOD: ICARE

## 2018-05-30 ASSESSMENT — SLIT LAMP EXAM - LIDS
COMMENTS: NORMAL
COMMENTS: NORMAL

## 2018-05-30 NOTE — PROGRESS NOTES
1. Progressive retrobulbar right optic neuropathy- etiology remains unclear at this time.  MRI brain and orbits with and without IV contrast  was unremarkable on radiology read and my review.  Right sided temporal artery biopsy negative.  There was some improvement in vision while on steroids but not clear if this was the natural history of his disease or whether it was steroid responsive.  Will continue to observe and check serial acute phase reactants (which have thus far been normal).  If spike in acute phase reactants then perform left temporal artery biopsy. If vision loss recurs then repeat neuro-imaging and also consider temporal artery biopsy if neuro-imaging negative.    - For a more thorough description of the disease presentation, please see my letter from the patient's initial visit with me    - status post  3 weeks of Prednisone starting 4/4/18.  Vision improved from 20/50 visual acuity to 20/25 and 0/11 to 7.5/11 Ishihara color plates.    - today patient denies any new vision changes since last visit (off prednisone since the end of April)  - Exam today shows mostly stable vision although color vision is down slightly in the right eye.  - right optic nerve head shows trace temporal pallor.    - octopus automated 30 degree visual field today shows stable global depression in the right eye and stable nonspecific deficits of unclear significance (possible artifact) in the left eye.    Recheck ESR / CRP today.  If spikes then consider left temporal artery biopsy.  If vision loss recurs then fluorescein angiography, left temporal artery biopsy consideration, and repeat MRI brain/orbits.    Follow-up in 1 month or sooner as needed for new vision loss.         I spent a total of 25 minutes face to face with Salty Cavanaugh during today's office visit.  Over 50% of this time was spent counseling the patient and/or coordinating care regarding his optic neuropathy of unclear cause.    Complete documentation  of historical and exam elements from today's encounter can be found in the full encounter summary report (not reduplicated in this progress note).  I personally obtained the chief complaint(s) and history of present illness.  I confirmed and edited as necessary the review of systems, past medical/surgical history, family history, social history, and examination findings as documented by others; and I examined the patient myself.  I personally reviewed the relevant tests, images, and reports as documented above.  I formulated and edited as necessary the assessment and plan and discussed the findings and management plan with the patient and family     Jorge Zarate MD

## 2018-05-30 NOTE — NURSING NOTE
Chief Complaints and History of Present Illnesses   Patient presents with     Follow Up For     subjective visual disturbance     HPI    Symptoms:              Comments:  4-6 weeks follow up for:    1. Progressive retrobulbar right optic neuropathy- etiology remains unclear at this time.     Patient states no new changes. Vision stable in both eyes.     ISIDRA Arreguin 5/30/2018 10:51 AM

## 2018-05-30 NOTE — MR AVS SNAPSHOT
After Visit Summary   5/30/2018    Salty Cavanaugh    MRN: [id number]           Patient Information     Date Of Birth          1960        Visit Information        Provider Department      5/30/2018 10:30 AM Jorge Zarate MD [facility]        Today's Diagnoses     Visual field defect        Subjective visual disturbance           Follow-ups after your visit        Your next 10 appointments already scheduled     Jul 06, 2018 10:30 AM CDT   RETURN NEURO with Jorge Zarate MD   Eye Clinic ([facility])    [address] [address]   [phone]              Future tests that were ordered for you today     Open Future Orders        Priority Expected Expires Ordered    Erythrocyte sedimentation rate auto Routine  8/28/2018 5/30/2018    CRP inflammation Routine  8/28/2018 5/30/2018            Who to contact     Please call your clinic at [phone] to:    Ask questions about your health    Make or cancel appointments    Discuss your medicines    Learn about your test results    Speak to your doctor            Additional Information About Your Visit        ePub DirectharPowerPlan Information     Gruvi gives you secure access to your electronic health record. If you see a primary care provider, you can also send messages to your care team and make appointments. If you have questions, please call your primary care clinic.  If you do not have a primary care provider, please call [phone] and they will assist you.      Gruvi is an electronic gateway that provides easy, online access to your medical records. With Gruvi, you can request a clinic appointment, read your test results, renew a prescription or communicate with your care team.     To access your existing account, please contact your [facility] or call [phone] for assistance.        Care EveryWhere ID     This is your Care  EveryWhere ID. This could be used by other organizations to access your [place] medical records  [id number]         Blood Pressure from Last 3 Encounters:   No data found for BP    Weight from Last 3 Encounters:   No data found for Wt              We Performed the Following     Glaucoma Top OU     IOP Measurement     OCT Optic Nerve RNFL Spectralis OU (both eyes)        Primary Care Provider Office Phone # Fax #    Duane Taylor [phone]       [address] [address]        Equal Access to Services     MILENA DURHAM : Hadii aad ku hadasho Soomaali, waaxda luqadaha, qaybta kaalmada adeegyada, waxay idiin hayaan adeeg kharamartha israel . So [place] -[phone].    ATENCIÓN: Si habla español, tiene a espinoza disposición servicios gratuitos de asistencia lingüística. [facility] [phone].    We comply with applicable federal civil rights laws and [place] laws. We do not discriminate on the basis of race, color, national origin, age, disability, sex, sexual orientation, or gender identity.            Thank you!     Thank you for choosing EYE CLINIC  for your care. Our goal is always to provide you with excellent care. Hearing back from our patients is one way we can continue to improve our services. Please take a few minutes to complete the written survey that you may receive in the mail after your visit with us. Thank you!             Your Updated Medication List - Protect others around you: Learn how to safely use, store and throw away your medicines at www.disposemymeds.org.          This list is accurate as of 5/30/18 12:02 PM.  Always use your most recent med list.                   Brand Name Dispense Instructions for use Diagnosis    amLODIPine 2.5 MG tablet    NORVASC          aspirin 81 MG EC tablet      Take 81 mg by mouth        atenolol 25 MG tablet    TENORMIN     TAKE ONE TABLET BY MOUTH EVERY DAY        fluticasone 50 MCG/ACT spray    FLONASE     INSTILL 2  SPRAYS NASALLY ONE TIME A DAY. SHAKE WELL BEFORE USE; ALTERN ATE NOSTRILS WITH EACH SPRAY.        furosemide 20 MG tablet    LASIX     Take 1 tab every Monday weds and friday        loratadine 10 MG tablet    CLARITIN     Take 10 mg by mouth        losartan 50 MG tablet    COZAAR     TAKE ONE TABLET BY MOUTH EVERY DAY        meclizine 25 MG tablet    ANTIVERT     Take 25 mg by mouth        metFORMIN 500 MG tablet    GLUCOPHAGE     TAKE ONE TABLET BY MOUTH EVERY DAY WITH FOOD        Misc. Devices Misc      Supply requested: vacuum erection device.  Diagnosis: erectile dysfunction        pantoprazole 40 MG EC tablet    PROTONIX     TAKE ONE TABLET BY MOUTH EVERY DAY        predniSONE 20 MG tablet    DELTASONE    90 tablet    Take 3 tablets (60 mg) by mouth every morning    Optic neuropathy       rosuvastatin 10 MG tablet    CRESTOR     Take 10 mg by mouth        simvastatin 10 MG tablet    ZOCOR          vardenafil 20 MG tablet    LEVITRA     Take 20 mg by mouth        vitamin D3 1000 units Caps

## 2018-05-31 ENCOUNTER — TRANSFERRED RECORDS (OUTPATIENT)
Dept: HEALTH INFORMATION MANAGEMENT | Facility: CLINIC | Age: 58
End: 2018-05-31

## 2018-07-05 DIAGNOSIS — H53.10 SUBJECTIVE VISUAL DISTURBANCE: Primary | ICD-10-CM

## 2018-07-06 ENCOUNTER — OFFICE VISIT (OUTPATIENT)
Dept: OPHTHALMOLOGY | Facility: CLINIC | Age: 58
End: 2018-07-06
Attending: OPHTHALMOLOGY
Payer: COMMERCIAL

## 2018-07-06 DIAGNOSIS — H53.10 SUBJECTIVE VISUAL DISTURBANCE: ICD-10-CM

## 2018-07-06 DIAGNOSIS — H46.9 OPTIC NEUROPATHY: Primary | ICD-10-CM

## 2018-07-06 DIAGNOSIS — H47.20 PARTIAL OPTIC ATROPHY: ICD-10-CM

## 2018-07-06 PROCEDURE — 92133 CPTRZD OPH DX IMG PST SGM ON: CPT | Mod: ZF | Performed by: OPHTHALMOLOGY

## 2018-07-06 PROCEDURE — 92083 EXTENDED VISUAL FIELD XM: CPT | Mod: ZF | Performed by: OPHTHALMOLOGY

## 2018-07-06 PROCEDURE — G0463 HOSPITAL OUTPT CLINIC VISIT: HCPCS | Mod: ZF

## 2018-07-06 ASSESSMENT — REFRACTION_WEARINGRX
OD_CYLINDER: +1.00
OS_AXIS: 125
OD_ADD: +1.75
OS_CYLINDER: +0.50
OD_SPHERE: -1.00
OS_ADD: +1.75
OD_AXIS: 180
OS_SPHERE: -0.75

## 2018-07-06 ASSESSMENT — TONOMETRY
IOP_METHOD: ICARE
OS_IOP_MMHG: 13
OD_IOP_MMHG: 11

## 2018-07-06 ASSESSMENT — VISUAL ACUITY
OS_CC: 20/20
OD_CC: 20/25
OD_CC+: SEARCHING
METHOD: SNELLEN - LINEAR
CORRECTION_TYPE: GLASSES

## 2018-07-06 ASSESSMENT — SLIT LAMP EXAM - LIDS
COMMENTS: NORMAL
COMMENTS: NORMAL

## 2018-07-06 ASSESSMENT — CONF VISUAL FIELD
OD_INFERIOR_TEMPORAL_RESTRICTION: 3
OD_INFERIOR_NASAL_RESTRICTION: 3
OS_NORMAL: 1

## 2018-07-06 ASSESSMENT — CUP TO DISC RATIO
OD_RATIO: 0.2
OS_RATIO: 0.2

## 2018-07-06 ASSESSMENT — EXTERNAL EXAM - RIGHT EYE: OD_EXAM: NORMAL

## 2018-07-06 ASSESSMENT — EXTERNAL EXAM - LEFT EYE: OS_EXAM: NORMAL

## 2018-07-06 NOTE — MR AVS SNAPSHOT
After Visit Summary   2018    Salty Cavanaugh    MRN: [id number]           Patient Information     Date Of Birth          1960        Visit Information        Provider Department      2018 10:30 AM Jorge Zarate MD [facility]        Today's Diagnoses     Subjective visual disturbance           Follow-ups after your visit        Your next 10 appointments already scheduled     Oct 12, 2018 10:30 AM CDT   RETURN NEURO with Jorge Zarate MD   Eye Clinic ([facility])    [address]  [facility]  [facility] [address]   [phone]              Future tests that were ordered for you today     Open Future Orders        Priority Expected Expires Ordered    OCT Optic Nerve RNFL Spectralis OU (both eyes) Routine  9/3/2018 2018            Who to contact     Please call your clinic at [phone] to:    Ask questions about your health    Make or cancel appointments    Discuss your medicines    Learn about your test results    Speak to your doctor            Additional Information About Your Visit        MyChart Information     [employer] is an electronic gateway that provides easy, online access to your medical records. With [employer], you can request a clinic appointment, read your test results, renew a prescription or communicate with your care team.     To sign up for [employer] visit the website at [email or web address]   You will be asked to enter the access code listed below, as well as some personal information. Please follow the directions to create your username and password.     Your access code is: [email or web address]  Expires: 2018  6:31 AM     Your access code will  in 90 days. If you need help or a new code, please contact your [facility] or call [phone] for assistance.        Care EveryWhere ID     This is your Care EveryWhere ID. This could be used by other  organizations to access your [place] medical records  [id number]         Blood Pressure from Last 3 Encounters:   No data found for BP    Weight from Last 3 Encounters:   No data found for Wt              We Performed the Following     Glaucoma Top OU     IOP Measurement        Primary Care Provider Office Phone # Fax #    Duane Strand [phone]       [facility] [address] [facility] [address]        Equal Access to Services     MILENA DURHAM : Hadii aad ku hadasho Soomaali, waaxda luqadaha, qaybta kaalmada adeegyada, waxay idiin hayaan adeeg gregoria lakostasraphael . So [facility] [phone].    ATENCIÓN: Si habla espdiego, tiene a espinoza disposición servicios gratuitos de asistencia lingüística. Fernandoame al [phone].    We comply with applicable federal civil rights laws and [place] laws. We do not discriminate on the basis of race, color, national origin, age, disability, sex, sexual orientation, or gender identity.            Thank you!     Thank you for choosing EYE CLINIC  for your care. Our goal is always to provide you with excellent care. Hearing back from our patients is one way we can continue to improve our services. Please take a few minutes to complete the written survey that you may receive in the mail after your visit with us. Thank you!             Your Updated Medication List - Protect others around you: Learn how to safely use, store and throw away your medicines at www.disposemymeds.org.          This list is accurate as of 7/6/18 12:05 PM.  Always use your most recent med list.                   Brand Name Dispense Instructions for use Diagnosis    amLODIPine 2.5 MG tablet    NORVASC          aspirin 81 MG EC tablet      Take 81 mg by mouth        atenolol 25 MG tablet    TENORMIN     TAKE ONE TABLET BY MOUTH EVERY DAY        fluticasone 50 MCG/ACT spray    FLONASE     INSTILL 2 SPRAYS NASALLY ONE TIME A DAY. SHAKE WELL BEFORE USE; ALTERN ATE NOSTRILS WITH EACH SPRAY.         furosemide 20 MG tablet    LASIX     Take 1 tab every Monday weds and friday        loratadine 10 MG tablet    CLARITIN     Take 10 mg by mouth        losartan 50 MG tablet    COZAAR     TAKE ONE TABLET BY MOUTH EVERY DAY        meclizine 25 MG tablet    ANTIVERT     Take 25 mg by mouth        metFORMIN 500 MG tablet    GLUCOPHAGE     TAKE ONE TABLET BY MOUTH EVERY DAY WITH FOOD        Misc. Devices Misc      Supply requested: vacuum erection device.  Diagnosis: erectile dysfunction        pantoprazole 40 MG EC tablet    PROTONIX     TAKE ONE TABLET BY MOUTH EVERY DAY        predniSONE 20 MG tablet    DELTASONE    90 tablet    Take 3 tablets (60 mg) by mouth every morning    Optic neuropathy       rosuvastatin 10 MG tablet    CRESTOR     Take 10 mg by mouth        simvastatin 10 MG tablet    ZOCOR          vardenafil 20 MG tablet    LEVITRA     Take 20 mg by mouth        vitamin D3 1000 units Caps

## 2018-07-06 NOTE — PROGRESS NOTES
1. 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  MRI brain and orbits with and without IV contrast  was unremarkable on radiology read and my review.  Right sided temporal artery biopsy negative.  There was some improvement in vision while on steroids but not clear if this was the natural history of his disease or whether it was steroid responsive.      If vision loss recurs then repeat neuro-imaging and also consider temporal artery biopsy if neuro-imaging negative.     - For a more thorough description of the disease presentation, please see my letter from the patient's initial visit with me     - status post  3 weeks of Prednisone starting 4/4/18.  Vision improved from 20/50 visual acuity to 20/25 and 0/11 to 7.5/11 Ishihara color plates, now stable at 4/11 Ishihara color plates in the right eye.     - Today patient denies any new vision changes since last visit (off prednisone since the end of April)  - Exam today shows stable vision, color vision is still decreased today, stable from last visit but worse than the visit prior.  - right optic nerve head shows trace temporal pallor.  -Last CRP was <.1 (5/31/18), ESR 13. (Prior ESR on 5/2/18 was 11).     -OCT RNFL shows temporal and inferotemporal thinning of the right eye.  -Octopus automated 30 degree visual field today shows stable global depression in the right eye, moderate false negatives and stable nonspecific deficits of unclear significance (possible artifact) in the left eye.     If vision loss recurs then fluorescein angiography, left temporal artery biopsy consideration, and repeat MRI brain/orbits.     Follow-up in 2-3 months or sooner as needed for new vision loss.     Complete documentation of historical and exam elements from today's encounter can be found in the full encounter summary report (not reduplicated in this progress note).  I personally obtained the chief complaint(s) and history of present illness.  I  confirmed and edited as necessary the review of systems, past medical/surgical history, family history, social history, and examination findings as documented by others; and I examined the patient myself.  I personally reviewed the relevant tests, images, and reports as documented above.  I formulated and edited as necessary the assessment and plan and discussed the findings and management plan with the patient and family.  I personally reviewed the ophthalmic test(s) associated with this encounter, agree with the interpretation(s) as documented by the resident/fellow, and have edited the corresponding report(s) as necessary.     MD Kendra Rodgers MD  PGY-3 Ophthalmology

## 2018-07-06 NOTE — NURSING NOTE
Chief Complaints and History of Present Illnesses   Patient presents with     Neurologic Problem     f/u optic neuropathy     HPI    Symptoms:              Comments:   Salty is a 57 year old male returning for follow-up with a history of:    1. Progressive retrobulbar right optic neuropathy    No change to vision since the last visit.

## 2018-10-11 DIAGNOSIS — H53.10 SUBJECTIVE VISUAL DISTURBANCE: ICD-10-CM

## 2018-10-11 DIAGNOSIS — H46.9 OPTIC NEUROPATHY: ICD-10-CM

## 2018-10-12 ENCOUNTER — OFFICE VISIT (OUTPATIENT)
Dept: OPHTHALMOLOGY | Facility: CLINIC | Age: 58
End: 2018-10-12
Attending: OPHTHALMOLOGY
Payer: COMMERCIAL

## 2018-10-12 DIAGNOSIS — H53.10 SUBJECTIVE VISUAL DISTURBANCE: ICD-10-CM

## 2018-10-12 DIAGNOSIS — H46.9 OPTIC NEUROPATHY: ICD-10-CM

## 2018-10-12 DIAGNOSIS — H47.20 PARTIAL OPTIC ATROPHY: Primary | ICD-10-CM

## 2018-10-12 PROCEDURE — 92133 CPTRZD OPH DX IMG PST SGM ON: CPT | Mod: ZF | Performed by: OPHTHALMOLOGY

## 2018-10-12 PROCEDURE — G0463 HOSPITAL OUTPT CLINIC VISIT: HCPCS | Mod: ZF | Performed by: TECHNICIAN/TECHNOLOGIST

## 2018-10-12 PROCEDURE — 92083 EXTENDED VISUAL FIELD XM: CPT | Mod: ZF | Performed by: OPHTHALMOLOGY

## 2018-10-12 ASSESSMENT — CONF VISUAL FIELD
OS_NORMAL: 1
METHOD: COUNTING FINGERS
OD_NORMAL: 1

## 2018-10-12 ASSESSMENT — REFRACTION_WEARINGRX
OD_AXIS: 180
OS_SPHERE: -0.75
OS_CYLINDER: +0.50
OD_SPHERE: -1.00
OS_ADD: +1.75
OS_AXIS: 125
OD_ADD: +1.75
OD_CYLINDER: +1.00

## 2018-10-12 ASSESSMENT — TONOMETRY
OD_IOP_MMHG: 15
IOP_METHOD: ICARE
OS_IOP_MMHG: 14

## 2018-10-12 ASSESSMENT — CUP TO DISC RATIO
OS_RATIO: 0.2
OD_RATIO: 0.2

## 2018-10-12 ASSESSMENT — EXTERNAL EXAM - RIGHT EYE: OD_EXAM: NORMAL

## 2018-10-12 ASSESSMENT — SLIT LAMP EXAM - LIDS
COMMENTS: NORMAL
COMMENTS: NORMAL

## 2018-10-12 ASSESSMENT — VISUAL ACUITY
OS_CC: 20/20
METHOD: SNELLEN - LINEAR
CORRECTION_TYPE: GLASSES

## 2018-10-12 ASSESSMENT — EXTERNAL EXAM - LEFT EYE: OS_EXAM: NORMAL

## 2018-10-12 NOTE — MR AVS SNAPSHOT
After Visit Summary   10/12/2018    Salty Cavanaugh    MRN: [id number]           Patient Information     Date Of Birth          1960        Visit Information        Provider Department      10/12/2018 10:30 AM Jorge Zarate MD [facility]        Today's Diagnoses     Partial optic atrophy - Right Eye    -  1    Optic neuropathy - Right Eye        Subjective visual disturbance           Follow-ups after your visit        Who to contact     Please call your clinic at [phone] to:    Ask questions about your health    Make or cancel appointments    Discuss your medicines    Learn about your test results    Speak to your doctor            Additional Information About Your Visit        MyChart Information     [employer] is an electronic gateway that provides easy, online access to your medical records. With [employer], you can request a clinic appointment, read your test results, renew a prescription or communicate with your care team.     To sign up for [employer] visit the website at [email or web address]   You will be asked to enter the access code listed below, as well as some personal information. Please follow the directions to create your username and password.     Your access code is: [email or web address]  Expires: 2018  6:31 AM     Your access code will  in 90 days. If you need help or a new code, please contact your [facility] or call [phone] for assistance.        Care EveryWhere ID     This is your Care EveryWhere ID. This could be used by other organizations to access your [place] medical records  [id number]         Blood Pressure from Last 3 Encounters:   No data found for BP    Weight from Last 3 Encounters:   No data found for Wt              We Performed the Following     Glaucoma Top OU     IOP Measurement     OCT Optic Nerve RNFL Spectralis OU (both eyes)        Primary Care Provider Office Phone # Fax #    Duane Strand  [phone]       [place] [address] [facility] [address]        Equal Access to Services     DALJITROSANNE HERMINIO : Alban pereira liza Oreilly, kathryn charlesdakshaha, danna karicky lundberg, dax kinney laLorridayanna piña. So [facility] [phone].    ATENCIÓN: Si habla español, tiene a espinoza disposición servicios gratuitos de asistencia lingüística. Llame al [phone].    We comply with applicable federal civil rights laws and [place] laws. We do not discriminate on the basis of race, color, national origin, age, disability, sex, sexual orientation, or gender identity.            Thank you!     Thank you for choosing EYE CLINIC  for your care. Our goal is always to provide you with excellent care. Hearing back from our patients is one way we can continue to improve our services. Please take a few minutes to complete the written survey that you may receive in the mail after your visit with us. Thank you!             Your Updated Medication List - Protect others around you: Learn how to safely use, store and throw away your medicines at www.disposemymeds.org.          This list is accurate as of 10/12/18 11:59 PM.  Always use your most recent med list.                   Brand Name Dispense Instructions for use Diagnosis    amLODIPine 2.5 MG tablet    NORVASC          aspirin 81 MG EC tablet      Take 81 mg by mouth        atenolol 25 MG tablet    TENORMIN     TAKE ONE TABLET BY MOUTH EVERY DAY        fluticasone 50 MCG/ACT spray    FLONASE     INSTILL 2 SPRAYS NASALLY ONE TIME A DAY. SHAKE WELL BEFORE USE; ALTERN ATE NOSTRILS WITH EACH SPRAY.        furosemide 20 MG tablet    LASIX     Take 1 tab every Monday weds and friday        loratadine 10 MG tablet    CLARITIN     Take 10 mg by mouth        losartan 50 MG tablet    COZAAR     TAKE ONE TABLET BY MOUTH EVERY DAY        meclizine 25 MG tablet    ANTIVERT     Take 25 mg by mouth        metFORMIN 500 MG tablet    GLUCOPHAGE      TAKE ONE TABLET BY MOUTH EVERY DAY WITH FOOD        Misc. Devices Misc      Supply requested: vacuum erection device.  Diagnosis: erectile dysfunction        pantoprazole 40 MG EC tablet    PROTONIX     TAKE ONE TABLET BY MOUTH EVERY DAY        predniSONE 20 MG tablet    DELTASONE    90 tablet    Take 3 tablets (60 mg) by mouth every morning    Optic neuropathy       rosuvastatin 10 MG tablet    CRESTOR     Take 10 mg by mouth        simvastatin 10 MG tablet    ZOCOR          vardenafil 20 MG tablet    LEVITRA     Take 20 mg by mouth        vitamin D3 1000 units Caps

## 2018-10-12 NOTE — PROGRESS NOTES
1. Retrobulbar right optic neuropathy- onset 3/23/18- etiology remains unclear.  MRI brain and orbits with and without IV contrast  was unremarkable on radiology read and my review.  Right sided temporal artery biopsy negative.  There was some improvement in vision while on steroids but not clear if this was the natural history of his disease or whether it was steroid responsive.       If vision loss recurs then repeat neuro-imaging and also consider temporal artery biopsy if neuro-imaging negative.  Vision today stable in both eyes compared to last visit.  Patient will follow-up with me as needed if there are any new vision changes.      - For a more thorough description of the disease presentation, please see my letter from the patient's initial visit with me          - Today patient denies any new vision changes since last visit (off prednisone since the end of April). He denies any headaches, jaw claudication, scalp tenderness, weight loss. He is otherwise feeling his normal self.     - Exam today shows stable vision, color vision is still decreased today, stable from last visit    - right optic nerve head shows trace temporal pallor.  - Last CRP was <.1 (5/31/18), ESR 13. (Prior ESR on 5/2/18 was 11).      -OCT RNFL shows mild progressive thinning right eye, normal left eye   -Octopus automated 30 degree visual field today shows stable global depression in the right eye, improved and nonspecific defects      STABLE examination today (6 months since onset). Mild progressive thinning expected on retinal nerve fiber layer OCT.     Return to clinic as needed.          Complete documentation of historical and exam elements from today's encounter can be found in the full encounter summary report (not reduplicated in this progress note).  I personally obtained the chief complaint(s) and history of present illness.  I confirmed and edited as necessary the review of systems, past medical/surgical history, family  history, social history, and examination findings as documented by others; and I examined the patient myself.  I personally reviewed the relevant tests, images, and reports as documented above.  I formulated and edited as necessary the assessment and plan and discussed the findings and management plan with the patient and family.  I personally reviewed the ophthalmic test(s) associated with this encounter, agree with the interpretation(s) as documented by the resident/fellow, and have edited the corresponding report(s) as necessary.     MD Praveen Rodgers MD  Neuro-ophthalmology fellow

## 2018-10-12 NOTE — NURSING NOTE
Chief Complaints and History of Present Illnesses   Patient presents with     Follow Up For     optic atrophy     HPI    Symptoms:              Comments:  Patient denies any new changes in vision each eye, stable.     Cathryn Snyder CO 10/12/2018 10:40 AM

## 2020-12-07 ENCOUNTER — TELEPHONE (OUTPATIENT)
Dept: OPHTHALMOLOGY | Facility: CLINIC | Age: 60
End: 2020-12-07

## 2020-12-07 NOTE — TELEPHONE ENCOUNTER
Spoke to pt at 1015    Right eye quick little flashing in periphery times 4 days  No new floaters  No vision changes     Reviewed s/s of vitreous changes and offered exam today    Pt lives 3 hours away and reviewed ok to see regular eye doctor today/tomorrow    If not able to see local eye doctor may call direct triage line to review scheduling for tomorrow    Pt verbally demonstrated understanding    Yong Minor RN 10:29 AM 12/07/20          [facility]    Phone Message    May a detailed message be left on voicemail: yes     Reason for Call: Symptoms or Concerns     If patient has red-flag symptoms, warm transfer to triage line    Current symptom or concern: flashing light in R eye     Symptoms have been present for:  4 day(s)    Has patient previously been seen for this? No      Are there any new or worsening symptoms? No      Action Taken: Message routed to:  [facility]     Travel Screening: Not Applicable

## 2022-12-18 ENCOUNTER — HEALTH MAINTENANCE LETTER (OUTPATIENT)
Age: 62
End: 2022-12-18

## 2023-11-18 ENCOUNTER — HEALTH MAINTENANCE LETTER (OUTPATIENT)
Age: 63
End: 2023-11-18